# Patient Record
Sex: MALE | Race: WHITE | Employment: OTHER | URBAN - METROPOLITAN AREA
[De-identification: names, ages, dates, MRNs, and addresses within clinical notes are randomized per-mention and may not be internally consistent; named-entity substitution may affect disease eponyms.]

---

## 2022-01-19 ENCOUNTER — TELEPHONE (OUTPATIENT)
Dept: FAMILY MEDICINE CLINIC | Age: 47
End: 2022-01-19

## 2022-01-19 ENCOUNTER — HOSPITAL ENCOUNTER (OUTPATIENT)
Age: 47
Setting detail: SPECIMEN
Discharge: HOME | End: 2022-01-19

## 2022-01-19 ENCOUNTER — NURSE ONLY (OUTPATIENT)
Dept: FAMILY MEDICINE CLINIC | Age: 47
End: 2022-01-19

## 2022-01-19 DIAGNOSIS — Z11.52 ENCOUNTER FOR SCREENING FOR COVID-19: Primary | ICD-10-CM

## 2022-01-19 PROCEDURE — U0005 INFEC AGEN DETEC AMPLI PROBE: HCPCS

## 2022-01-19 PROCEDURE — U0003 INFECTIOUS AGENT DETECTION BY NUCLEIC ACID (DNA OR RNA); SEVERE ACUTE RESPIRATORY SYNDROME CORONAVIRUS 2 (SARS-COV-2) (CORONAVIRUS DISEASE [COVID-19]), AMPLIFIED PROBE TECHNIQUE, MAKING USE OF HIGH THROUGHPUT TECHNOLOGIES AS DESCRIBED BY CMS-2020-01-R: HCPCS

## 2022-01-19 NOTE — TELEPHONE ENCOUNTER
I ordered the COVID test just so patient can get this done and start working. He will have to reestablish care since he has not been seen here in 9 years.   I apologize for the  confusion

## 2022-01-19 NOTE — TELEPHONE ENCOUNTER
Patient called state he is starting a new job on Monday and they are requiring him to have a COVID test done. Patient advised he has not been seen here since 2013 and need to reestablish. Patient state he came in two months ago and was told he is a patient and did not need to make an appointment.   Patient wanting to know if Dr. Lexie Catalan will order this test.  Please advise

## 2022-01-20 LAB
SARS-COV-2: DETECTED
SOURCE: ABNORMAL

## 2022-02-03 ENCOUNTER — TELEMEDICINE (OUTPATIENT)
Dept: FAMILY MEDICINE CLINIC | Age: 47
End: 2022-02-03

## 2022-02-03 DIAGNOSIS — U07.1 COVID-19: Primary | ICD-10-CM

## 2022-02-03 PROCEDURE — 99203 OFFICE O/P NEW LOW 30 MIN: CPT | Performed by: FAMILY MEDICINE

## 2022-02-03 ASSESSMENT — PATIENT HEALTH QUESTIONNAIRE - PHQ9
SUM OF ALL RESPONSES TO PHQ QUESTIONS 1-9: 0
1. LITTLE INTEREST OR PLEASURE IN DOING THINGS: 0
SUM OF ALL RESPONSES TO PHQ9 QUESTIONS 1 & 2: 0
2. FEELING DOWN, DEPRESSED OR HOPELESS: 0

## 2022-02-03 NOTE — PROGRESS NOTES
membranes are moist.     External Ears [x] Normal  [] Abnormal-     Neck: [x] No visualized mass     Pulmonary/Chest: [x] Respiratory effort normal.  [x] No visualized signs of difficulty breathing or respiratory distress        [] Abnormal-          ASSESSMENT/PLAN:  1. COVID-19  Pt will obtain a test either at a pharmacy or will come in tomorrow at be tested      Return if symptoms worsen or fail to improve. Marion Oliva, was evaluated through a synchronous (real-time) audio-video encounter. The patient (or guardian if applicable) is aware that this is a billable service, which includes applicable co-pays. This Virtual Visit was conducted with patient's (and/or legal guardian's) consent. The visit was conducted pursuant to the emergency declaration under the [address] [place] [address] authority and the [employer] and [employer] General Act. Patient identification was verified, and a caregiver was present when appropriate. The patient was located at home in a state where the provider was licensed to provide care. Patient was at home during this virtual visit    Total time spent on this encounter: Not billed by time    --Ellie Pinon MD on 2/3/2022 at 8:47 AM    An electronic signature was used to authenticate this note.

## 2022-02-08 ENCOUNTER — TELEPHONE (OUTPATIENT)
Dept: FAMILY MEDICINE CLINIC | Age: 47
End: 2022-02-08

## 2022-02-08 NOTE — LETTER
[address]  [address] [place] [address]  Phone: [phone]  Fax: [phone]    Destini Sanders MD        February 8, 2022     Patient: Cordell Reis   YOB: 1975   Date of Visit: 2/8/2022       To Whom It May Concern: It is my medical opinion that Brayan Reyes Had a negative Covid test today and is cleared to begin work. If you have any questions or concerns, please don't hesitate to call.     Sincerely,        Destini Sanders MD

## 2023-01-09 ENCOUNTER — TELEMEDICINE (OUTPATIENT)
Dept: FAMILY MEDICINE CLINIC | Age: 48
End: 2023-01-09

## 2023-01-09 DIAGNOSIS — J06.9 VIRAL URI: Primary | ICD-10-CM

## 2023-01-09 PROCEDURE — 99213 OFFICE O/P EST LOW 20 MIN: CPT | Performed by: FAMILY MEDICINE

## 2023-01-09 RX ORDER — DOXYCYCLINE HYCLATE 100 MG/1
TABLET, DELAYED RELEASE ORAL
COMMUNITY

## 2023-01-09 RX ORDER — DEXTROMETHORPHAN HYDROBROMIDE AND PROMETHAZINE HYDROCHLORIDE 15; 6.25 MG/5ML; MG/5ML
5 SYRUP ORAL 4 TIMES DAILY PRN
Qty: 240 ML | Refills: 0 | Status: SHIPPED | OUTPATIENT
Start: 2023-01-09

## 2023-01-09 ASSESSMENT — ENCOUNTER SYMPTOMS
WHEEZING: 0
SHORTNESS OF BREATH: 0

## 2023-01-09 ASSESSMENT — PATIENT HEALTH QUESTIONNAIRE - PHQ9
SUM OF ALL RESPONSES TO PHQ9 QUESTIONS 1 & 2: 0
SUM OF ALL RESPONSES TO PHQ QUESTIONS 1-9: 0
1. LITTLE INTEREST OR PLEASURE IN DOING THINGS: 0
SUM OF ALL RESPONSES TO PHQ QUESTIONS 1-9: 0
2. FEELING DOWN, DEPRESSED OR HOPELESS: 0

## 2023-01-09 NOTE — PROGRESS NOTES
2023    TELEHEALTH EVALUATION -- Audio/Visual (During [facility]- public health emergency)    HPI:    Nicole Lambert (:  1975) has requested an audio/video evaluation for the following concern(s):    Upper Respiratory Infection: Patient complains of symptoms of a URI. Symptoms include congestion, cough, and fever. Onset of symptoms was 6 days ago, gradually improving since that time. He is drinking plenty of fluids. Evaluation to date: seen in urgent care 4 days ago and diagnosed with bronchitis. Treatment to date: Doxycycline and benzonatate. Review of Systems   Constitutional:  Positive for fatigue. Respiratory:  Negative for shortness of breath and wheezing. All other systems reviewed and are negative. Prior to Visit Medications    Not on File       Social History     Tobacco Use    Smoking status: Never        Allergies   Allergen Reactions    Nicotine    , No past medical history on file.,   Past Surgical History:   Procedure Laterality Date    INGUINAL HERNIA REPAIR Right    ,   Social History     Tobacco Use    Smoking status: Never       PHYSICAL EXAMINATION:  [ INSTRUCTIONS:  \"[x]\" Indicates a positive item  \"[]\" Indicates a negative item  -- DELETE ALL ITEMS NOT EXAMINED]  Vital Signs: (As obtained by patient/caregiver or practitioner observation)    Not available    Constitutional: [x] Appears well-developed and well-nourished [x] No apparent distress      [] Abnormal-   Mental status  [x] Alert and awake  [x] Oriented to person/place/time [x]Able to follow commands      Eyes:  EOM    [x]  Normal  [] Abnormal-  Sclera  [x]  Normal  [] Abnormal -         Discharge [x]  None visible  [] Abnormal -    HENT:   [x] Normocephalic, atraumatic.   [] Abnormal   [x] Mouth/Throat: Mucous membranes are moist.     External Ears [x] Normal  [] Abnormal-     Neck: [x] No visualized mass     Pulmonary/Chest: [x] Respiratory effort normal.  [x] No visualized signs of difficulty breathing or respiratory distress        [x] Abnormal-coughing       ASSESSMENT/PLAN:  1. Viral URI  Given I believe this is still a viral infection, patient should complete the antibiotics until they are completed. I will send prescription for cough medicine. Patient also needs a work note to return to work on 1/16/2023.  - promethazine-dextromethorphan (PROMETHAZINE-DM) 6.25-15 MG/5ML syrup; Take 5 mLs by mouth 4 times daily as needed for Cough  Dispense: 240 mL; Refill: 0    Return if symptoms worsen or fail to improve. Maida , was evaluated through a synchronous (real-time) audio-video encounter. The patient (or guardian if applicable) is aware that this is a billable service, which includes applicable co-pays. This Virtual Visit was conducted with patient's (and/or legal guardian's) consent. The visit was conducted pursuant to the emergency declaration under the [address] [place] [address] authority and the [facility] and [employer] Act. Patient identification was verified, and a caregiver was present when appropriate. The patient was located at Home: [place] [address]. Provider was located at [facility] ([place] [address]): [address] [place], [place] [address] Total time spent on this encounter:     --Yadira Rogers MD on 1/9/2023 at 10:47 AM    An electronic signature was used to authenticate this note.

## 2023-01-09 NOTE — LETTER
[address]  [address] [place] [address]  Phone: [phone]  Fax: [phone]    Wilson Solitario MD        January 9, 2023     Patient: Genesis Zimmer   YOB: 1975   Date of Visit: 1/9/2023       To Whom It May Concern: It is my medical opinion that Jung Taylor may return to work on 1/16/22. If you have any questions or concerns, please don't hesitate to call.     Sincerely,        Wilson Solitario MD

## 2023-01-17 ENCOUNTER — OFFICE VISIT (OUTPATIENT)
Dept: FAMILY MEDICINE CLINIC | Age: 48
End: 2023-01-17

## 2023-01-17 VITALS
TEMPERATURE: 96.6 F | OXYGEN SATURATION: 97 % | DIASTOLIC BLOOD PRESSURE: 100 MMHG | BODY MASS INDEX: 38.9 KG/M2 | SYSTOLIC BLOOD PRESSURE: 146 MMHG | HEIGHT: 67 IN | HEART RATE: 84 BPM

## 2023-01-17 DIAGNOSIS — R03.0 ELEVATED BLOOD-PRESSURE READING WITHOUT DIAGNOSIS OF HYPERTENSION: ICD-10-CM

## 2023-01-17 DIAGNOSIS — R05.8 POST-VIRAL COUGH SYNDROME: Primary | ICD-10-CM

## 2023-01-17 RX ORDER — ALBUTEROL SULFATE 90 UG/1
2 AEROSOL, METERED RESPIRATORY (INHALATION) 4 TIMES DAILY PRN
Qty: 18 G | Refills: 0 | Status: SHIPPED | OUTPATIENT
Start: 2023-01-17

## 2023-01-17 NOTE — PROGRESS NOTES
Patient with postinfectious cough. Patient was seen by me virtually on 1/9/2023 to follow-up on his upper respiratory infection. He had already seen urgent care and been on doxycycline and Tessalon Perles. I added Promethazine DM at that time. He had already been sick for 6 days and his last at work had been 1/4/2023. 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 He is feeling somewhat better. His work stated he needed paperwork with accommodations (ADA accommodation form) because he is missed too much work and he will get fired. His work note was not enough    O:   Vitals:    01/17/23 1531   BP: (!) 146/100   Pulse: 84   Temp: (!) 96.6 °F (35.9 °C)   SpO2: 97%     No acute distress. Alert and Oriented x 3, obese  HEENT: Atraumatic. Normocephalic. PERRLA, EOMI, Conjunctiva clear  Oropharynx clear, mucosa moist.  Nasal mucosa normal  NECK: without thyromegaly, lymphadenopathy, JVD  LUNGS:Clear to ascultation bilaterally. Breathing comfortably  CARDIOVASCULAR:  Regular rate and rhythm, no murmurs, rubs, or gallops  SKIN: Warm, Dry, No rash. PSYCH: Mood and Affect normal.    A:    Diagnosis Orders   1. Post-viral cough syndrome  albuterol sulfate HFA (VENTOLIN HFA) 108 (90 Base) MCG/ACT inhaler   Needs improvement UT WORK RELATED/MED DBLT XM TREATING PHYS      2. Elevated blood-pressure reading without diagnosis of hypertension     Unclear etiology but could be due to decongestant     P: We will start albuterol MDI for now. DASH Diet for now  ADA accommodation paperwork completed. Recommend patient return to work on 1/20/2023.   Follow-up as needed

## 2023-09-05 ENCOUNTER — TELEPHONE (OUTPATIENT)
Dept: FAMILY MEDICINE CLINIC | Age: 48
End: 2023-09-05

## 2023-09-05 DIAGNOSIS — U07.1 COVID: Primary | ICD-10-CM

## 2023-09-05 NOTE — TELEPHONE ENCOUNTER
----- Message from [facility] sent at 9/5/2023 10:48 AM EDT -----  Subject: Message to Provider    QUESTIONS  Information for Provider? patient states he will have 5 days out for Covid   and no need to call him back  ---------------------------------------------------------------------------  --------------  [address]  [phone]; OK to leave message on voicemail,OK to respond with electronic   message via [employer] portal (only for patients who have registered [employer]   account)  ---------------------------------------------------------------------------  --------------  SCRIPT ANSWERS  Relationship to Patient?  Self

## 2023-09-05 NOTE — TELEPHONE ENCOUNTER
Pt called in stating that he tested positive for Covid on 9/5/2023. Pt state that his symptoms are congestion and sore throat. Please advise.

## 2024-06-11 ENCOUNTER — OFFICE VISIT (OUTPATIENT)
Dept: FAMILY MEDICINE CLINIC | Age: 49
End: 2024-06-11

## 2024-06-11 VITALS
SYSTOLIC BLOOD PRESSURE: 170 MMHG | OXYGEN SATURATION: 97 % | HEART RATE: 97 BPM | DIASTOLIC BLOOD PRESSURE: 112 MMHG | TEMPERATURE: 98 F

## 2024-06-11 DIAGNOSIS — I10 PRIMARY HYPERTENSION: ICD-10-CM

## 2024-06-11 DIAGNOSIS — L91.8 ACQUIRED SKIN TAG: ICD-10-CM

## 2024-06-11 DIAGNOSIS — J06.9 VIRAL URI: Primary | ICD-10-CM

## 2024-06-11 PROCEDURE — 99214 OFFICE O/P EST MOD 30 MIN: CPT | Performed by: FAMILY MEDICINE

## 2024-06-11 PROCEDURE — 3077F SYST BP >= 140 MM HG: CPT | Performed by: FAMILY MEDICINE

## 2024-06-11 PROCEDURE — 3080F DIAST BP >= 90 MM HG: CPT | Performed by: FAMILY MEDICINE

## 2024-06-11 RX ORDER — DEXTROMETHORPHAN HYDROBROMIDE AND PROMETHAZINE HYDROCHLORIDE 15; 6.25 MG/5ML; MG/5ML
5 SYRUP ORAL 4 TIMES DAILY PRN
Qty: 240 ML | Refills: 0 | Status: SHIPPED | OUTPATIENT
Start: 2024-06-11

## 2024-06-11 RX ORDER — BENZONATATE 200 MG/1
200 CAPSULE ORAL 3 TIMES DAILY PRN
Qty: 30 CAPSULE | Refills: 0 | Status: SHIPPED | OUTPATIENT
Start: 2024-06-11

## 2024-06-11 RX ORDER — LISINOPRIL 20 MG/1
20 TABLET ORAL DAILY
Qty: 60 TABLET | Refills: 0 | Status: SHIPPED | OUTPATIENT
Start: 2024-06-11

## 2024-06-11 SDOH — ECONOMIC STABILITY: FOOD INSECURITY: WITHIN THE PAST 12 MONTHS, THE FOOD YOU BOUGHT JUST DIDN'T LAST AND YOU DIDN'T HAVE MONEY TO GET MORE.: NEVER TRUE

## 2024-06-11 SDOH — ECONOMIC STABILITY: INCOME INSECURITY: HOW HARD IS IT FOR YOU TO PAY FOR THE VERY BASICS LIKE FOOD, HOUSING, MEDICAL CARE, AND HEATING?: NOT HARD AT ALL

## 2024-06-11 SDOH — ECONOMIC STABILITY: HOUSING INSECURITY
IN THE LAST 12 MONTHS, WAS THERE A TIME WHEN YOU DID NOT HAVE A STEADY PLACE TO SLEEP OR SLEPT IN A SHELTER (INCLUDING NOW)?: NO

## 2024-06-11 SDOH — ECONOMIC STABILITY: FOOD INSECURITY: WITHIN THE PAST 12 MONTHS, YOU WORRIED THAT YOUR FOOD WOULD RUN OUT BEFORE YOU GOT MONEY TO BUY MORE.: NEVER TRUE

## 2024-06-11 ASSESSMENT — PATIENT HEALTH QUESTIONNAIRE - PHQ9
SUM OF ALL RESPONSES TO PHQ QUESTIONS 1-9: 0
1. LITTLE INTEREST OR PLEASURE IN DOING THINGS: NOT AT ALL
SUM OF ALL RESPONSES TO PHQ9 QUESTIONS 1 & 2: 0
SUM OF ALL RESPONSES TO PHQ QUESTIONS 1-9: 0
2. FEELING DOWN, DEPRESSED OR HOPELESS: NOT AT ALL
SUM OF ALL RESPONSES TO PHQ QUESTIONS 1-9: 0
SUM OF ALL RESPONSES TO PHQ QUESTIONS 1-9: 0

## 2024-06-11 NOTE — PROGRESS NOTES
Mahad Mckeon is a 48 y.o. year old male who complains of moderate headache, myalgias, nasal blockage, clear nasal discharge, post nasal drip, sinus and nasal congestion, and sore throat for 2 days. Symptoms are worsening over that time. He denies a history of fevers, wheezing, and shortness of breath and denies a history of asthma.  He has taken nothing for this.    Hypertension: Patient here for follow-up of elevated blood pressure. He is not exercising and is not adherent to low salt diet.  Blood pressure is not well controlled at home. Cardiac symptoms none. Patient denies chest pain.  Cardiovascular risk factors: male gender, obesity (BMI >= 30 kg/m2), and sedentary lifestyle. Use of agents associated with hypertension: none. History of target organ damage: none.    Painful skin tags on back when he sits back  Social History     Tobacco Use    Smoking status: Never    Smokeless tobacco: Not on file   Substance Use Topics    Alcohol use: Not on file        Current Outpatient Medications   Medication Sig Dispense Refill    albuterol sulfate HFA (VENTOLIN HFA) 108 (90 Base) MCG/ACT inhaler Inhale 2 puffs into the lungs 4 times daily as needed for Wheezing 18 g 0    Doxycycline Hyclate 100 MG TBEC       BENZONATATE PO       promethazine-dextromethorphan (PROMETHAZINE-DM) 6.25-15 MG/5ML syrup Take 5 mLs by mouth 4 times daily as needed for Cough 240 mL 0     No current facility-administered medications for this visit.       Allergies   Allergen Reactions    Nicotine     Shellfish Allergy Itching and Swelling          Objective:      BP (!) 170/112 (Site: Left Upper Arm, Position: Sitting, Cuff Size: Large Adult)   Pulse 97   Temp 98 °F (36.7 °C) (Infrared)   SpO2 97%   General: Alert and oriented, in no acute distress   TM's are normal bilaterally. External ears are normal.  Pharynx mildly erythematous without exudate. Tongue, teeth and lips are normal.  Neck and supraclavicular regions are without

## 2024-07-22 ENCOUNTER — OFFICE VISIT (OUTPATIENT)
Dept: FAMILY MEDICINE CLINIC | Age: 49
End: 2024-07-22

## 2024-07-22 VITALS
OXYGEN SATURATION: 98 % | HEIGHT: 70 IN | BODY MASS INDEX: 39.46 KG/M2 | WEIGHT: 275.6 LBS | HEART RATE: 82 BPM | SYSTOLIC BLOOD PRESSURE: 164 MMHG | DIASTOLIC BLOOD PRESSURE: 98 MMHG

## 2024-07-22 DIAGNOSIS — E66.01 CLASS 3 SEVERE OBESITY DUE TO EXCESS CALORIES WITH SERIOUS COMORBIDITY AND BODY MASS INDEX (BMI) OF 40.0 TO 44.9 IN ADULT (HCC): ICD-10-CM

## 2024-07-22 DIAGNOSIS — I10 PRIMARY HYPERTENSION: Primary | ICD-10-CM

## 2024-07-22 DIAGNOSIS — L91.8 ACQUIRED SKIN TAG: ICD-10-CM

## 2024-07-22 PROCEDURE — 3077F SYST BP >= 140 MM HG: CPT | Performed by: FAMILY MEDICINE

## 2024-07-22 PROCEDURE — 3080F DIAST BP >= 90 MM HG: CPT | Performed by: FAMILY MEDICINE

## 2024-07-22 PROCEDURE — 99214 OFFICE O/P EST MOD 30 MIN: CPT | Performed by: FAMILY MEDICINE

## 2024-07-22 RX ORDER — LISINOPRIL 40 MG/1
40 TABLET ORAL DAILY
Qty: 30 TABLET | Refills: 0 | Status: SHIPPED | OUTPATIENT
Start: 2024-07-22

## 2024-07-22 NOTE — PROGRESS NOTES
follow-up  Unable to treat skin tags today due to lack of liquid nitrogen in the office.  Will treat at blood pressure check  1)  Per Orders  2)  Regular aerobic exercise  3)  Sodium Restriction in diet     Follow-up with me 6 months

## 2024-08-22 ENCOUNTER — TELEPHONE (OUTPATIENT)
Dept: FAMILY MEDICINE CLINIC | Age: 49
End: 2024-08-22

## 2024-08-22 ENCOUNTER — NURSE ONLY (OUTPATIENT)
Dept: FAMILY MEDICINE CLINIC | Age: 49
End: 2024-08-22

## 2024-08-22 VITALS — DIASTOLIC BLOOD PRESSURE: 86 MMHG | SYSTOLIC BLOOD PRESSURE: 152 MMHG

## 2024-08-22 DIAGNOSIS — I10 PRIMARY HYPERTENSION: ICD-10-CM

## 2024-08-22 DIAGNOSIS — I10 PRIMARY HYPERTENSION: Primary | ICD-10-CM

## 2024-08-22 RX ORDER — LISINOPRIL 40 MG/1
40 TABLET ORAL DAILY
Qty: 30 TABLET | Refills: 5 | Status: SHIPPED | OUTPATIENT
Start: 2024-08-22

## 2024-08-22 NOTE — PROGRESS NOTES
Patient sat in room at 8:53 am returned at 8:59 am took BP in left arm with feet on floor.  BP- 152/86.    Patient stated he is out of lisinopril and will need a refill

## 2024-08-22 NOTE — TELEPHONE ENCOUNTER
Blood pressure improved but still needs improvement.  I would like to add a second medication to his regimen if he would agree.

## 2024-08-22 NOTE — TELEPHONE ENCOUNTER
I understand.  Just so he knows, and elevated blood pressure increases his risk for heart attack or stroke.

## 2025-02-25 ENCOUNTER — OFFICE VISIT (OUTPATIENT)
Dept: FAMILY MEDICINE CLINIC | Age: 50
End: 2025-02-25

## 2025-02-25 VITALS
HEART RATE: 97 BPM | WEIGHT: 287.8 LBS | BODY MASS INDEX: 41.89 KG/M2 | TEMPERATURE: 96 F | OXYGEN SATURATION: 94 % | DIASTOLIC BLOOD PRESSURE: 92 MMHG | SYSTOLIC BLOOD PRESSURE: 160 MMHG

## 2025-02-25 DIAGNOSIS — G44.52 NEW DAILY PERSISTENT HEADACHE: ICD-10-CM

## 2025-02-25 DIAGNOSIS — I10 PRIMARY HYPERTENSION: Primary | ICD-10-CM

## 2025-02-25 PROCEDURE — 3077F SYST BP >= 140 MM HG: CPT | Performed by: FAMILY MEDICINE

## 2025-02-25 PROCEDURE — 3080F DIAST BP >= 90 MM HG: CPT | Performed by: FAMILY MEDICINE

## 2025-02-25 PROCEDURE — 99214 OFFICE O/P EST MOD 30 MIN: CPT | Performed by: FAMILY MEDICINE

## 2025-02-25 RX ORDER — LISINOPRIL 40 MG/1
40 TABLET ORAL DAILY
Qty: 30 TABLET | Refills: 5 | Status: SHIPPED | OUTPATIENT
Start: 2025-02-25

## 2025-02-25 SDOH — ECONOMIC STABILITY: FOOD INSECURITY: WITHIN THE PAST 12 MONTHS, YOU WORRIED THAT YOUR FOOD WOULD RUN OUT BEFORE YOU GOT MONEY TO BUY MORE.: NEVER TRUE

## 2025-02-25 ASSESSMENT — PATIENT HEALTH QUESTIONNAIRE - PHQ9
1. LITTLE INTEREST OR PLEASURE IN DOING THINGS: NOT AT ALL
SUM OF ALL RESPONSES TO PHQ QUESTIONS 1-9: 0
SUM OF ALL RESPONSES TO PHQ QUESTIONS 1-9: 0
2. FEELING DOWN, DEPRESSED OR HOPELESS: NOT AT ALL
SUM OF ALL RESPONSES TO PHQ QUESTIONS 1-9: 0
SUM OF ALL RESPONSES TO PHQ9 QUESTIONS 1 & 2: 0
SUM OF ALL RESPONSES TO PHQ QUESTIONS 1-9: 0

## 2025-02-25 NOTE — PROGRESS NOTES
History of Present Illness  The patient presents for evaluation of a headache and hypertension.    He has been experiencing a persistent headache, which he attributes to the discontinuation of his blood pressure medication, lisinopril, due to an oversight in refilling the prescription. The intensity of the headache is rated as 3 or 4 on a pain scale of 10, describing it as more of an annoyance than severe pain. He reports no associated nausea. His last meal was consumed approximately 3 hours prior to the visit. He also reports no chest pain, shortness of breath, or lightheadedness.    MEDICATIONS  lisinopril    O:   Vitals:    02/25/25 1303   BP: (!) 160/92   Pulse: 97   Temp: (!) 96 °F (35.6 °C)   SpO2: 94%     No acute distress.  Alert and Oriented x 3, obese  HEENT PERRLA, EOMI, Conjunctiva clear  Oropharynx clear, mucosa moist.  Nasal mucosa normal  NECK: without thyromegaly, lymphadenopathy, JVD  LUNGS:Clear to ascultation bilaterally.  Breathing comfortably  CARDIOVASCULAR:  Regular rate and rhythm, no murmurs, rubs, or gallops  NEURO: Cranial nerves II-XII grossly intact.  Strength 5/5, DTR 2/4.  SKIN: Warm, Dry, No rash.  PSYCH: Mood and Affect normal.    Assessment & Plan  1. Hypertension.  His blood pressure is elevated, likely due to running out of his blood pressure medication, lisinopril. He reports a headache with a severity of 3-4/10, which he attributes to the missed doses. No nausea, chest pain, shortness of breath, or lightheadedness were reported. A prescription for lisinopril will be sent to [facility]. He is advised to monitor his blood pressure and return in a couple of weeks for a blood pressure check to ensure it is controlled. Blood work is recommended to monitor for potential kidney or electrolyte issues but is deferred due to financial constraints.    2.  Headache  Likely due to elevated blood pressure due to running out of his blood pressure medications.    Follow-up  The patient

## 2025-06-02 ENCOUNTER — COMMUNITY OUTREACH (OUTPATIENT)
Dept: FAMILY MEDICINE CLINIC | Age: 50
End: 2025-06-02